# Patient Record
Sex: FEMALE | Race: WHITE
[De-identification: names, ages, dates, MRNs, and addresses within clinical notes are randomized per-mention and may not be internally consistent; named-entity substitution may affect disease eponyms.]

---

## 2017-04-02 ENCOUNTER — HOSPITAL ENCOUNTER (OUTPATIENT)
Dept: HOSPITAL 62 - ER | Age: 22
Setting detail: OBSERVATION
LOS: 1 days | Discharge: HOME | End: 2017-04-03
Attending: SURGERY
Payer: SELF-PAY

## 2017-04-02 DIAGNOSIS — K35.80: Primary | ICD-10-CM

## 2017-04-02 LAB
ALBUMIN SERPL-MCNC: 4.6 G/DL (ref 3.7–5.6)
ALP SERPL-CCNC: 42 U/L (ref 50–135)
ALT SERPL-CCNC: 58 U/L (ref 5–35)
ANION GAP SERPL CALC-SCNC: 15 MMOL/L (ref 5–19)
APPEARANCE UR: (no result)
AST SERPL-CCNC: 24 U/L (ref 5–30)
BASOPHILS # BLD AUTO: 0 10^3/UL (ref 0–0.2)
BASOPHILS NFR BLD AUTO: 0.2 % (ref 0–2)
BILIRUB DIRECT SERPL-MCNC: 0.3 MG/DL (ref 0–0.4)
BILIRUB SERPL-MCNC: 0.6 MG/DL (ref 0.2–1.3)
BILIRUB UR QL STRIP: NEGATIVE
BUN SERPL-MCNC: 14 MG/DL (ref 7–20)
CALCIUM: 10 MG/DL (ref 8.4–10.2)
CHLORIDE SERPL-SCNC: 107 MMOL/L (ref 98–107)
CO2 SERPL-SCNC: 25 MMOL/L (ref 22–30)
CREAT SERPL-MCNC: 0.82 MG/DL (ref 0.52–1.25)
EOSINOPHIL # BLD AUTO: 0.2 10^3/UL (ref 0–0.6)
EOSINOPHIL NFR BLD AUTO: 1.1 % (ref 0–6)
ERYTHROCYTE [DISTWIDTH] IN BLOOD BY AUTOMATED COUNT: 12.8 % (ref 11.5–14)
GLUCOSE SERPL-MCNC: 91 MG/DL (ref 75–110)
GLUCOSE UR STRIP-MCNC: NEGATIVE MG/DL
HCT VFR BLD CALC: 40.3 % (ref 36–47)
HGB BLD-MCNC: 14 G/DL (ref 12–15.5)
HGB HCT DIFFERENCE: 1.7
KETONES UR STRIP-MCNC: (no result) MG/DL
LIPASE SERPL-CCNC: 76 U/L (ref 23–300)
LYMPHOCYTES # BLD AUTO: 2.2 10^3/UL (ref 0.5–4.7)
LYMPHOCYTES NFR BLD AUTO: 13.7 % (ref 13–45)
MCH RBC QN AUTO: 28.9 PG (ref 27–33.4)
MCHC RBC AUTO-ENTMCNC: 34.6 G/DL (ref 32–36)
MCV RBC AUTO: 84 FL (ref 80–97)
MONOCYTES # BLD AUTO: 0.7 10^3/UL (ref 0.1–1.4)
MONOCYTES NFR BLD AUTO: 4.4 % (ref 3–13)
NEUTROPHILS # BLD AUTO: 12.7 10^3/UL (ref 1.7–8.2)
NEUTS SEG NFR BLD AUTO: 80.6 % (ref 42–78)
NITRITE UR QL STRIP: NEGATIVE
PH UR STRIP: 5 [PH] (ref 5–9)
POTASSIUM SERPL-SCNC: 4.3 MMOL/L (ref 3.6–5)
PROT SERPL-MCNC: 7.1 G/DL (ref 6.3–8.2)
PROT UR STRIP-MCNC: NEGATIVE MG/DL
RBC # BLD AUTO: 4.83 10^6/UL (ref 3.72–5.28)
SODIUM SERPL-SCNC: 146.5 MMOL/L (ref 137–145)
SP GR UR STRIP: 1.03
UROBILINOGEN UR-MCNC: NEGATIVE MG/DL (ref ?–2)
WBC # BLD AUTO: 15.7 10^3/UL (ref 4–10.5)

## 2017-04-02 PROCEDURE — 85025 COMPLETE CBC W/AUTO DIFF WBC: CPT

## 2017-04-02 PROCEDURE — 84702 CHORIONIC GONADOTROPIN TEST: CPT

## 2017-04-02 PROCEDURE — 81001 URINALYSIS AUTO W/SCOPE: CPT

## 2017-04-02 PROCEDURE — 83690 ASSAY OF LIPASE: CPT

## 2017-04-02 PROCEDURE — 74177 CT ABD & PELVIS W/CONTRAST: CPT

## 2017-04-02 PROCEDURE — 44970 LAPAROSCOPY APPENDECTOMY: CPT

## 2017-04-02 PROCEDURE — 88304 TISSUE EXAM BY PATHOLOGIST: CPT

## 2017-04-02 PROCEDURE — 36415 COLL VENOUS BLD VENIPUNCTURE: CPT

## 2017-04-02 PROCEDURE — S0119 ONDANSETRON 4 MG: HCPCS

## 2017-04-02 PROCEDURE — 80053 COMPREHEN METABOLIC PANEL: CPT

## 2017-04-02 PROCEDURE — 0DTJ4ZZ RESECTION OF APPENDIX, PERCUTANEOUS ENDOSCOPIC APPROACH: ICD-10-PCS | Performed by: SURGERY

## 2017-04-02 PROCEDURE — G0378 HOSPITAL OBSERVATION PER HR: HCPCS

## 2017-04-02 RX ADMIN — MORPHINE SULFATE PRN MG: 10 INJECTION INTRAMUSCULAR; INTRAVENOUS; SUBCUTANEOUS at 23:23

## 2017-04-02 NOTE — ER DOCUMENT REPORT
ED General





- General


Chief Complaint: Abdominal Pain


Stated Complaint: ABDOMINAL PAIN


Notes: 


Patient is a 21-year-old female without past medical history, no prior surgical 

history who presents with 2 weeks of diarrhea and approximately 12 hours of 

progressively worsening diffuse abdominal pain.  Describes the pain as being 

diffuse although worse in the right lower quadrant.  It is stabbing, cramping 

and constant.  Nothing improves or worsens the pain.  She has had one episode 

of associated vomiting.  She's had 5 total episodes of watery diarrhea today.  

She denies any history of similar symptoms in the past.  She saw her primary 

care physician regarding the diarrheal episodes and was started on Augmentin 

for concerns of a sinus infection without improvement of her symptoms.  She has 

not had any vaginal bleeding or discharge.  She denies any melena, hematochezia 

or hemoptysis.


TRAVEL OUTSIDE OF THE [place] IN LAST 30 DAYS: No





- Related Data


Allergies/Adverse Reactions: 


 





No Known Allergies Allergy (Verified 04/02/17 19:53)


 








Home Medications: 


 Current Home Medications





Medroxyprogesterone Acet [Provera 2.5 Mg Tablet] 1 tab PO DAILY 04/02/17 [

History]











Past Medical History





- General


Information source: Patient





- Social History


Smoking Status: Never Smoker


Frequency of alcohol use: None


Drug Abuse: None


Lives with: Family


Family History: Reviewed & Not Pertinent


Patient has suicidal ideation: No


Patient has homicidal ideation: No


Renal/ Medical History: Denies: Hx Peritoneal Dialysis


Past Surgical History: Reports: Hx Oral Surgery





- Immunizations


Immunizations up to date: Yes


Hx Diphtheria, Pertussis, Tetanus Vaccination: Yes





Review of Systems





- Review of Systems


Notes: 


Constitutional: Negative for fever.


HENT: Negative for sore throat.


Eyes: Negative for visual changes.


Cardiovascular: Negative for chest pain.


Respiratory: Negative for shortness of breath.


Gastrointestinal: Positive for abdominal pain, vomiting and diarrhea.


Genitourinary: Negative for dysuria.


Musculoskeletal: Negative for back pain.


Skin: Negative for rash.


Neurological: Negative for headaches, weakness or numbness.





10 point ROS negative except as marked above and in HPI.





Physical Exam





- Vital signs


Vitals: 


 











Temp Pulse Resp BP Pulse Ox


 


 98.0 F   62   18   127/87 H  99 


 


 04/02/17 19:53  04/02/17 19:53  04/02/17 19:53  04/02/17 19:53  04/02/17 19:53











Interpretation: Normal


Notes: 


PHYSICAL EXAMINATION:





GENERAL: Appears uncomfortable and in significant pain.





HEAD: Atraumatic, normocephalic.





EYES: Pupils equal round and reactive to light, extraocular movements intact, 

sclera anicteric, conjunctiva are normal.





ENT: nares patent, oropharynx clear without exudates.  Moderately dry mucous 

membranes.





NECK: Normal range of motion, supple without lymphadenopathy





LUNGS: Breath sounds clear to auscultation bilaterally and equal.  No wheezes 

rales or rhonchi.





HEART: Regular rate and rhythm without murmurs





ABDOMEN: Soft, diffuse tenderness on palpation most focal in the right lower 

quadrant.  She does have voluntary guarding throughout.  Rebound tenderness is 

present in the right lower quadrant





EXTREMITIES: Normal range of motion, no pitting or edema.  No cyanosis.





NEUROLOGICAL: No focal neurological deficits. Moves all extremities 

spontaneously and on command.





PSYCH: Anxious, tearful.





SKIN: Warm, Dry, normal turgor, no rashes or lesions noted.





Course





- Re-evaluation


Re-evalutation: 


04/02/17 23:09


Patient presents with diffuse abdominal pain with associated nausea, vomiting 

and diarrhea that is not been present for 14 days.  This is consistent with 

likely infectious colitis she does have diffuse abdominal tenderness with 

guarding throughout.  This pain did start spontaneously earlier today and 

patient is in visible pain at time of assessment, tearful.  Will proceed with 

CT the abdomen and pelvis to exclude a possible perforation vs appendicitis 

given the worst tenderness is in the RLQ.  Also provide analgesia, antiemetics 

and IV fluids.





04/03/17 01:59


CT shows acute appendicitis. Patient now has localized pain only to the RLQ. I 

have discussed The case with the surgeon on call  will admit the 

patient for surgical management.  IV Zosyn has been started.  Patient is 

nothing by mouth.








- Vital Signs


Vital signs: 


 











Temp Pulse Resp BP Pulse Ox


 


 98.0 F   62   18   127/87 H  99 


 


 04/02/17 19:53  04/02/17 19:53  04/02/17 19:53  04/02/17 19:53  04/02/17 19:53














- Laboratory


Result Diagrams: 


 04/02/17 20:40





 04/02/17 20:40


Laboratory results interpreted by me: 


 











  04/02/17 04/02/17 04/02/17





  20:40 20:40 22:10


 


WBC  15.7 H  


 


Seg Neutrophils %  80.6 H  


 


Absolute Neutrophils  12.7 H  


 


Sodium   146.5 H 


 


ALT   58 H 


 


Alkaline Phosphatase   42 L 


 


Urine Ketones    TRACE H


 


Urine Blood    MODERATE H














- Diagnostic Test


Radiology reviewed: Reports reviewed





Discharge





- Discharge


Clinical Impression: 


Acute appendicitis


Qualifiers:


 Acute appendicitis type: with generalized peritonitis Qualified Code(s): K35.2 

- Acute appendicitis with generalized peritonitis





Disposition: ADMITTED AS OBSERVATION


Admitting Provider: Surgicalist - Eva


Unit Admitted: OR

## 2017-04-02 NOTE — ER DOCUMENT REPORT
ED Medical Screen (RME)





- General


Chief Complaint: Abdominal Pain


Stated Complaint: ABDOMINAL PAIN


TRAVEL OUTSIDE OF THE [place] IN LAST 30 DAYS: No





- HPI


Patient complains to provider of: abdominal pain nausea vomiting diarrhea


Notes: 


04/02/17 20:38


Patient recently treated for upper her story infection with Augmentin still is 

on Augmentin now is having abdominal pain nausea vomiting diarrhea.





04/02/17 20:38


Patient sitting with water bottle in triage





- Related Data


Allergies/Adverse Reactions: 


 





No Known Allergies Allergy (Verified 04/02/17 19:53)


 








Home Medications: 


 Current Home Medications





Medroxyprogesterone Acet [Provera 2.5 Mg Tablet] 1 tab PO DAILY 04/02/17 [

History]











Past Medical History


Renal/ Medical History: Denies: Hx Peritoneal Dialysis


Past Surgical History: Reports: Hx Oral Surgery





- Immunizations


Immunizations up to date: Yes


Hx Diphtheria, Pertussis, Tetanus Vaccination: Yes





Review of Systems





- Review of Systems


Gastrointestinal: Abdominal pain, Diarrhea, Nausea, Vomiting





Physical Exam





- Vital signs


Vitals: 





 











Temp Pulse Resp BP Pulse Ox


 


 98.0 F   62   18   127/87 H  99 


 


 04/02/17 19:53  04/02/17 19:53  04/02/17 19:53  04/02/17 19:53  04/02/17 19:53














- Respiratory


Respiratory status: No respiratory distress


Chest status: Nontender


Breath sounds: Normal





Course





- Vital Signs


Vital signs: 





 











Temp Pulse Resp BP Pulse Ox


 


 98.0 F   62   18   127/87 H  99 


 


 04/02/17 19:53  04/02/17 19:53  04/02/17 19:53  04/02/17 19:53  04/02/17 19:53

## 2017-04-03 VITALS — DIASTOLIC BLOOD PRESSURE: 58 MMHG | SYSTOLIC BLOOD PRESSURE: 102 MMHG

## 2017-04-03 RX ADMIN — MORPHINE SULFATE PRN MG: 10 INJECTION INTRAMUSCULAR; INTRAVENOUS; SUBCUTANEOUS at 02:47

## 2017-04-03 RX ADMIN — PIPERACILLIN AND TAZOBACTAM SCH GM: 3; .375 INJECTION, POWDER, LYOPHILIZED, FOR SOLUTION INTRAVENOUS; PARENTERAL at 17:34

## 2017-04-03 RX ADMIN — OXYCODONE AND ACETAMINOPHEN PRN TAB: 5; 325 TABLET ORAL at 20:15

## 2017-04-03 RX ADMIN — PIPERACILLIN AND TAZOBACTAM SCH GM: 3; .375 INJECTION, POWDER, LYOPHILIZED, FOR SOLUTION INTRAVENOUS; PARENTERAL at 12:04

## 2017-04-03 RX ADMIN — OXYCODONE AND ACETAMINOPHEN PRN TAB: 5; 325 TABLET ORAL at 15:42

## 2017-04-03 NOTE — OPERATIVE REPORT E
Operative Report



NAME: RACHEL MARIANO

MRN:  [id number]          : 1995 AGE:  21Y

DATE OF SURGERY: 2017                        ROOM: [id number]



PREOPERATIVE DIAGNOSIS:

ACUTE APPENDICITIS.



POSTOPERATIVE DIAGNOSIS:

ACUTE APPENDICITIS.



OPERATION:

Laparoscopic appendectomy.



SURGEON:

JOSE MORALES M.D.



INDICATION:

This is a 21-year-old female with abdominal pains for the past 24 hours,

associated with nausea, and vomiting.  She came to the emergency room and

noted to have acute appendicitis on a CAT scan.



DESCRIPTION OF PROCEDURE:

After adequate general anesthesia, the abdomen was then prepped and draped

in the usual sterile fashion.  An appropriate time out was then called.



Next, an infraumbilical elliptical incision was then made and the fascia

elevated and divided with a #15 blade.  The opening was then dilated with

a Rosaura clamp down into the abdominal cavity.  A 12 mm Luca trocar was

then inserted into the abdominal cavity and CO2 insufflated to this

trocar, up pressure of 15 mmHg.  Next, a 5 mm trocar placed in the

suprapubic area and a 12 mm in the left lower quadrant area under direct

vision.  Next, the appendix was then identified and noted to be quite

inflamed.  It was also quite retrocecal.  The cecum had some adhesions in

the abdominal wall, which was lysed with harmonic juliann.  The appendix

was then lifted up and part of the mesoappendix divided with harmonic

juliann down to the cecal area.  The appendix noted to be normal. *------*

of the cecum,and this was then stapled with an endo RUDOLPH.  The appendix

placed in an Endobag and pulled out through the left lower quadrant port. 

Next, hemostasis then checked and noted to have no active bleeding noted. 

This was irrigated with saline solution.  The stump looks good.  Rest of

the abdominal cavity was inspected.  No other obvious abnormality noted. 

The trocars were then removed and CO2 allowed through the trocar sites.  

The infraumbilical fascia was then closed with single figure-of-eight

suture using 0 Vicryl.  All the skin incisions were then closed with

running subcuticular closure using 4-0 Monocryl. A  Dermabond dressing was

then placed over the incision sites.  Needle, instrument, and sponge

counts were all correct, and estimated blood loss was minimal.  Patient

then brought to the recovery room in satisfactory condition.









DICTATING PHYSICIAN:  JOSE MORALES M.D.





[id number]                  DT: [id number]

PHY#: [id number]            DD: 2017

ID:   [id number]           JOB#: [id number]       ACCT: [id number]



cc:JOSE MORALES M.D.

>

## 2017-04-03 NOTE — DISCHARGE SUMMARY E
Discharge Summary



NAME: RACHEL MARIANO

MRN:  [id number]        : 1995     AGE: 21Y

ADMITTED: 2017                  DISCHARGED: 2017



REASON FOR ADMISSION:

Acute abdominal pain.



SUMMARY OF HOSPITALIZATION:

The patient is a 21-year-old female who presented to the emergency

department complaining of abdominal pain.  She was found to have right

lower quadrant tenderness and a leukocytosis.  CT scan revealed findings

consistent with acute appendicitis.  She was admitted to the surgicalist

service for definitive management.



The patient was taken to the operating room by Dr. Velasquez where she

underwent laparoscopic appendectomy.  The patient tolerated the procedure

well.  There were no complications.  Later that day, she was getting about

well, tolerating a diet and ready for discharge home.



FINAL DIAGNOSIS:

Acute appendicitis status post laparoscopic appendectomy.



DISPOSITION:

The patient can be discharged home to the care of her family, follow up

with [facility] in approximately 1 to 2 weeks and be on a

restricted activity status.  She will take Tylenol or Motrin p.r.n. pain.





DICTATING PHYSICIAN:  ELIZABETH LOUIE M.D.





[id number]                  DT: 2017

PHY#: [id number]            DD: 2017

ID:   [id number]           JOB#: [id number]       ACCT: [id number]



cc:ALBERTO FRANCO MD, M.D TIMOTHY PATSELAS, M.D.

>

## 2020-06-05 ENCOUNTER — HOSPITAL ENCOUNTER (OUTPATIENT)
Dept: HOSPITAL 62 - RAD | Age: 25
End: 2020-06-05
Payer: COMMERCIAL

## 2020-06-05 DIAGNOSIS — R56.9: Primary | ICD-10-CM

## 2020-06-05 DIAGNOSIS — R55: ICD-10-CM

## 2020-06-05 PROCEDURE — 70553 MRI BRAIN STEM W/O & W/DYE: CPT

## 2020-06-05 PROCEDURE — A9576 INJ PROHANCE MULTIPACK: HCPCS
